# Patient Record
Sex: MALE | Race: WHITE | NOT HISPANIC OR LATINO | Employment: FULL TIME | URBAN - METROPOLITAN AREA
[De-identification: names, ages, dates, MRNs, and addresses within clinical notes are randomized per-mention and may not be internally consistent; named-entity substitution may affect disease eponyms.]

---

## 2021-12-28 ENCOUNTER — OFFICE VISIT (OUTPATIENT)
Dept: INTERNAL MEDICINE CLINIC | Facility: OTHER | Age: 26
End: 2021-12-28
Payer: COMMERCIAL

## 2021-12-28 VITALS
WEIGHT: 120.8 LBS | SYSTOLIC BLOOD PRESSURE: 104 MMHG | TEMPERATURE: 99.2 F | RESPIRATION RATE: 18 BRPM | OXYGEN SATURATION: 97 % | HEIGHT: 70 IN | DIASTOLIC BLOOD PRESSURE: 76 MMHG | BODY MASS INDEX: 17.29 KG/M2 | HEART RATE: 80 BPM

## 2021-12-28 DIAGNOSIS — M94.0 COSTOCHONDRITIS: ICD-10-CM

## 2021-12-28 DIAGNOSIS — Z13.6 ENCOUNTER FOR LIPID SCREENING FOR CARDIOVASCULAR DISEASE: ICD-10-CM

## 2021-12-28 DIAGNOSIS — Q67.7 PECTUS CARINATUM: ICD-10-CM

## 2021-12-28 DIAGNOSIS — J30.1 SEASONAL ALLERGIC RHINITIS DUE TO POLLEN: ICD-10-CM

## 2021-12-28 DIAGNOSIS — Z13.220 ENCOUNTER FOR LIPID SCREENING FOR CARDIOVASCULAR DISEASE: ICD-10-CM

## 2021-12-28 DIAGNOSIS — N50.82 SCROTAL PAIN: Primary | ICD-10-CM

## 2021-12-28 PROCEDURE — 3725F SCREEN DEPRESSION PERFORMED: CPT | Performed by: INTERNAL MEDICINE

## 2021-12-28 PROCEDURE — 99203 OFFICE O/P NEW LOW 30 MIN: CPT | Performed by: INTERNAL MEDICINE

## 2022-01-02 ENCOUNTER — HOSPITAL ENCOUNTER (OUTPATIENT)
Dept: ULTRASOUND IMAGING | Facility: HOSPITAL | Age: 27
Discharge: HOME | End: 2022-01-02
Payer: COMMERCIAL

## 2022-01-02 DIAGNOSIS — N50.82 SCROTAL PAIN: ICD-10-CM

## 2022-01-02 PROCEDURE — 76870 US EXAM SCROTUM: CPT

## 2022-01-04 DIAGNOSIS — R93.811 ABNORMAL FINDING ON DIAGNOSTIC IMAGING OF RIGHT TESTICLE: Primary | ICD-10-CM

## 2022-02-15 NOTE — PROGRESS NOTES
2/17/2022    Tommas Kehr  1995  [id number]      Assessment  -Mitzi Siddiqui is a 32 y o  male who presents in consultation    1  Aretta Homans- we reviewed the results of his recent scrotal ultrasound performed on 01/02/2022 which identified a mild left varicocele and a 2 millimeter hypoechoic focus in right testicle  This was indeterminate  No vascularity was noted  Patient is currently asymptomatic  There were no significant findings noted on physical examination today  Reviewed supportive measures for intermittent testicular pain  We discussed follow-up imaging in the next 4 months to re-evaluate area on right testicle  He is amenable with this plan  He does state that he has a high deductible plan and would like to hold off on imaging  Discussed with patient that imaging should be performed within the next 6 months and to not postpone  We also reviewed performing monthly self-testicular examinations  Follow-up in 4 months with repeat scrotal ultrasound  He was instructed to call sooner with any issues     -All questions answered, patient agrees with plan      History of Present Illness  32 y o  male who presents in consultation today for evaluation or orchialgia  Patient referred by his PCP  He reports intermittent episodes of dull, left-sided testicular pain in November  Patient states it occurred for 1 month  He denies any scrotal swelling, redness, or tenderness  Patient's PCP ordered a scrotal ultrasound which was performed on 01/2/2022  Results identified a mild left varicocele and 2 mm hypoechoic focus in right testicle  Patient denies any prior urologic history  He denies any history of undescended testicle or testicular torsion  Patient denies any strong family history of prostate or testicular malignancy  He states he engages in regular physical activity which typically includes hiking and backpacking  He has no other complaints    Patient is a lifetime nonsmoker  Review of Systems  Review of Systems   Constitutional: Negative  HENT: Negative  Respiratory: Negative  Cardiovascular: Negative  Gastrointestinal: Negative  Genitourinary: Negative for decreased urine volume, difficulty urinating, dysuria, flank pain, frequency, hematuria, penile discharge, penile pain, penile swelling, scrotal swelling, testicular pain and urgency  Musculoskeletal: Negative  Skin: Negative  Neurological: Negative  Psychiatric/Behavioral: Negative  Past Medical History  History reviewed  No pertinent past medical history  Past Social History  History reviewed  No pertinent surgical history  Past Family History  Family History   Problem Relation Age of Onset    Hyperlipidemia Maternal Grandfather     Other Paternal Grandfather         sinus ca       Past Social history  Social History     Socioeconomic History    Marital status: Single     Spouse name: Not on file    Number of children: Not on file    Years of education: Not on file    Highest education level: Not on file   Occupational History    Not on file   Tobacco Use    Smoking status: Never Smoker    Smokeless tobacco: Never Used   Vaping Use    Vaping Use: Some days    Substances: Nicotine   Substance and Sexual Activity    Alcohol use: Yes     Alcohol/week: 3 0 standard drinks     Types: 3 Cans of beer per week     Comment: few drinks a week    Drug use: Never    Sexual activity: Not on file   Other Topics Concern    Not on file   Social History Narrative    Not on file     Social Determinants of Health     Financial Resource Strain: Not on file   Food Insecurity: Not on file   Transportation Needs: Not on file   Physical Activity: Not on file   Stress: Not on file   Social Connections: Not on file   Intimate Partner Violence: Not on file   Housing Stability: Not on file       Current Medications  No current outpatient medications on file       No current facility-administered medications for this visit  Allergies  Allergies   Allergen Reactions    Ibuprofen Facial Swelling, Hives and Itching       Past Medical History, Social History, Family History, medications and allergies were reviewed  Vitals  Vitals:    02/17/22 1107   BP: 128/72   BP Location: Left arm   Patient Position: Sitting   Cuff Size: Adult   Pulse: 65   SpO2: 100%   Weight: 56 7 kg (125 lb)   Height: 5' 9 5" (1 765 m)       Physical Exam  Physical Exam  Constitutional:       Appearance: Normal appearance  He is well-developed  HENT:      Head: Normocephalic  Eyes:      Pupils: Pupils are equal, round, and reactive to light  Pulmonary:      Effort: Pulmonary effort is normal    Abdominal:      Palpations: Abdomen is soft  Genitourinary:     Penis: Normal        Testes: Normal       Comments: Penis circumcised, testicles descended bilaterally, no scrotal edema, erythema, nontender  Musculoskeletal:         General: Normal range of motion  Cervical back: Normal range of motion  Skin:     General: Skin is warm and dry  Neurological:      General: No focal deficit present  Mental Status: He is alert and oriented to person, place, and time  Psychiatric:         Mood and Affect: Mood normal          Behavior: Behavior normal          Thought Content: Thought content normal          Judgment: Judgment normal          Results    I have personally reviewed all pertinent lab results and reviewed with patient  No results found for: PSA  No results found for: GLUCOSE, CALCIUM, NA, K, CO2, CL, BUN, CREATININE  No results found for: WBC, HGB, HCT, MCV, PLT  No results found for this or any previous visit (from the past 1 hour(s))

## 2022-02-17 ENCOUNTER — OFFICE VISIT (OUTPATIENT)
Dept: UROLOGY | Facility: AMBULATORY SURGERY CENTER | Age: 27
End: 2022-02-17
Payer: COMMERCIAL

## 2022-02-17 VITALS
SYSTOLIC BLOOD PRESSURE: 128 MMHG | WEIGHT: 125 LBS | DIASTOLIC BLOOD PRESSURE: 72 MMHG | OXYGEN SATURATION: 100 % | BODY MASS INDEX: 17.9 KG/M2 | HEIGHT: 70 IN | HEART RATE: 65 BPM

## 2022-02-17 DIAGNOSIS — R93.811 ABNORMAL FINDING ON DIAGNOSTIC IMAGING OF RIGHT TESTICLE: ICD-10-CM

## 2022-02-17 DIAGNOSIS — N50.82 SCROTAL PAIN: Primary | ICD-10-CM

## 2022-02-17 PROCEDURE — 99204 OFFICE O/P NEW MOD 45 MIN: CPT | Performed by: NURSE PRACTITIONER

## 2022-02-17 PROCEDURE — 3008F BODY MASS INDEX DOCD: CPT | Performed by: NURSE PRACTITIONER

## 2022-02-17 PROCEDURE — 1036F TOBACCO NON-USER: CPT | Performed by: NURSE PRACTITIONER
